# Patient Record
Sex: FEMALE | Race: WHITE | NOT HISPANIC OR LATINO | URBAN - METROPOLITAN AREA
[De-identification: names, ages, dates, MRNs, and addresses within clinical notes are randomized per-mention and may not be internally consistent; named-entity substitution may affect disease eponyms.]

---

## 2022-11-18 PROBLEM — R07.9 CHEST PAIN: Status: ACTIVE | Noted: 2022-11-18

## 2024-03-12 ENCOUNTER — OFFICE VISIT (OUTPATIENT)
Dept: URGENT CARE | Facility: CLINIC | Age: 59
End: 2024-03-12

## 2024-03-12 VITALS
TEMPERATURE: 98 F | RESPIRATION RATE: 18 BRPM | BODY MASS INDEX: 24.99 KG/M2 | OXYGEN SATURATION: 99 % | WEIGHT: 150 LBS | HEIGHT: 65 IN | DIASTOLIC BLOOD PRESSURE: 72 MMHG | HEART RATE: 78 BPM | SYSTOLIC BLOOD PRESSURE: 105 MMHG

## 2024-03-12 DIAGNOSIS — N39.44 NOCTURNAL ENURESIS: ICD-10-CM

## 2024-03-12 DIAGNOSIS — H60.12 CELLULITIS OF HELIX OF LEFT EAR: Primary | ICD-10-CM

## 2024-03-12 PROCEDURE — 99203 OFFICE O/P NEW LOW 30 MIN: CPT | Mod: S$GLB,,, | Performed by: EMERGENCY MEDICINE

## 2024-03-12 RX ORDER — DESMOPRESSIN ACETATE 0.1 MG/1
100 TABLET ORAL NIGHTLY
Qty: 30 TABLET | Refills: 0 | Status: SHIPPED | OUTPATIENT
Start: 2024-03-12 | End: 2024-04-11

## 2024-03-12 RX ORDER — METOPROLOL TARTRATE 25 MG/1
25 TABLET, FILM COATED ORAL 2 TIMES DAILY
COMMUNITY

## 2024-03-12 RX ORDER — MUPIROCIN 20 MG/G
OINTMENT TOPICAL 2 TIMES DAILY
Qty: 22 G | Refills: 0 | Status: SHIPPED | OUTPATIENT
Start: 2024-03-12 | End: 2024-03-19

## 2024-03-12 RX ORDER — CEPHALEXIN 500 MG/1
500 CAPSULE ORAL EVERY 8 HOURS
Qty: 21 CAPSULE | Refills: 0 | Status: SHIPPED | OUTPATIENT
Start: 2024-03-12 | End: 2024-03-19

## 2024-03-12 RX ORDER — DESMOPRESSIN ACETATE 0.1 MG/1
100 TABLET ORAL NIGHTLY
Qty: 30 TABLET | Refills: 0 | Status: SHIPPED | OUTPATIENT
Start: 2024-03-12 | End: 2024-03-12

## 2024-03-12 RX ORDER — CEPHALEXIN 500 MG/1
500 CAPSULE ORAL EVERY 8 HOURS
Qty: 21 CAPSULE | Refills: 0 | Status: SHIPPED | OUTPATIENT
Start: 2024-03-12 | End: 2024-03-12

## 2024-03-12 RX ORDER — MUPIROCIN 20 MG/G
OINTMENT TOPICAL 2 TIMES DAILY
Qty: 22 G | Refills: 0 | Status: SHIPPED | OUTPATIENT
Start: 2024-03-12 | End: 2024-03-12

## 2024-03-12 NOTE — PATIENT INSTRUCTIONS
While on antibiotics, take a daily probiotic such as Align or Culturelle or eat yogurt with live cultures (Activia is one example). This will lessen the chances of stomach upset.     If lesion on ear fails to resolve in 4 weeks, we recommend scheduling with Dermatologist for further evaluation.     Wear sunscreen.     A temporary refill of Desmopressin has been sent - followup with primary care doctor for additional refills and/or dose adjustment.

## 2024-03-12 NOTE — PROGRESS NOTES
"Subjective:      Patient ID: Yvette Favret is a 58 y.o. female.    Vitals:  height is 5' 5" (1.651 m) and weight is 68 kg (150 lb). Her temperature is 97.7 °F (36.5 °C). Her blood pressure is 105/72 and her pulse is 78. Her respiration is 18 and oxygen saturation is 99%.     Chief Complaint: Ear Problem    58 year old women states she has a sore on the back of her left ear for three weeks and is hot and red.  She says it had a pustule initially and drained pus but the redness has remained.  There is some mild tenderness.  She does get a lot of sun exposure but is trying to wear sunscreen in this area.  She has no history of skin cancer.  Patient denies fever and denies taking meds otc.         Constitution: Negative for chills and fever.   HENT:  Positive for ear pain. Negative for ear discharge and congestion.    Skin:  Positive for erythema.      Objective:     Physical Exam   Constitutional: She does not appear ill.   HENT:   Head: Normocephalic and atraumatic.   Ears:   Right Ear: impacted cerumen  Left Ear: impacted cerumen     Comments: Left ear: (see photo) There is a 1 cm area of erythema with central drained pustule to the superior helix, there is mild tenderness, there is no significant swelling  Cardiovascular: Regular rhythm.   Pulmonary/Chest: Effort normal. No respiratory distress.   Abdominal: Normal appearance.   Neurological: She is alert.   Skin: erythema   Nursing note and vitals reviewed.      Assessment:     1. Cellulitis of helix of left ear    2. Nocturnal enuresis        Plan:     We will treat for possible cellulitis of the left ear.  Discussed with patient that this could be a presentation of skin cancer and that the this does not resolve in 4 weeks as she needs to follow up with a dermatologist.  She understands that recommendation.    She is also requesting a temporary refill of her nighttime desmopressin that she takes for nocturnal enuresis.  She is tolerated it well.  She just moved " here and is trying to establish care locally.  I advised we can do a temporary 30 day refill to get her started but that future refills need to come from her PCP.    Cellulitis of helix of left ear  -     Discontinue: cephALEXin (KEFLEX) 500 MG capsule; Take 1 capsule (500 mg total) by mouth every 8 (eight) hours. for 7 days  Dispense: 21 capsule; Refill: 0  -     Discontinue: mupirocin (BACTROBAN) 2 % ointment; Apply topically 2 (two) times daily. for 7 days  Dispense: 22 g; Refill: 0  -     cephALEXin (KEFLEX) 500 MG capsule; Take 1 capsule (500 mg total) by mouth every 8 (eight) hours. for 7 days  Dispense: 21 capsule; Refill: 0  -     mupirocin (BACTROBAN) 2 % ointment; Apply topically 2 (two) times daily. for 7 days  Dispense: 22 g; Refill: 0    Nocturnal enuresis  -     Discontinue: desmopressin (DDAVP) 0.1 MG Tab; Take 1 tablet (100 mcg total) by mouth every evening.  Dispense: 30 tablet; Refill: 0  -     desmopressin (DDAVP) 0.1 MG Tab; Take 1 tablet (100 mcg total) by mouth every evening.  Dispense: 30 tablet; Refill: 0      Patient Instructions   While on antibiotics, take a daily probiotic such as Align or Culturelle or eat yogurt with live cultures (Activia is one example). This will lessen the chances of stomach upset.     If lesion on ear fails to resolve in 4 weeks, we recommend scheduling with Dermatologist for further evaluation.     Wear sunscreen.     A temporary refill of Desmopressin has been sent - followup with primary care doctor for additional refills and/or dose adjustment.